# Patient Record
Sex: MALE
[De-identification: names, ages, dates, MRNs, and addresses within clinical notes are randomized per-mention and may not be internally consistent; named-entity substitution may affect disease eponyms.]

---

## 2018-08-31 ENCOUNTER — HOSPITAL (OUTPATIENT)
Dept: OTHER | Age: 48
End: 2018-08-31
Attending: OBSTETRICS & GYNECOLOGY

## 2018-09-06 ENCOUNTER — HOSPITAL (OUTPATIENT)
Dept: OTHER | Age: 48
End: 2018-09-06
Attending: OBSTETRICS & GYNECOLOGY

## 2018-09-06 LAB
APPEARANCE SEMIN PLAS: NORMAL
COLLECTION METHOD SMN: ABNORMAL
LIQUEFACTION TIME SMN: NORMAL MIN
PH SMN: 8 [PH]
SEX ABSTIN DURATION TIME PATIENT: 2 D (ref 2–7)
SPECIMEN VOL SMN: 1.5 ML
SPERM # SMN: 12 MILLION/ML
SPERM IMMOTILE NFR SMN: 59 %
SPERM MOTILE 1H P EJAC NFR SMN: 19 %
SPERM NONPROG NFR SMN: 22 %
SPERM NORM NFR SMN: 16 %
SPERM VIABLE NFR SMN: 52 %
TYPE OF CONTAINER: ABNORMAL
VISC SMN QL: NORMAL

## 2019-11-18 ENCOUNTER — OFFICE VISIT (OUTPATIENT)
Dept: ORTHOPEDICS CLINIC | Facility: CLINIC | Age: 49
End: 2019-11-18

## 2019-11-18 VITALS — BODY MASS INDEX: 33.77 KG/M2 | WEIGHT: 228 LBS | HEIGHT: 69 IN

## 2019-11-18 DIAGNOSIS — S46.211A RUPTURE OF DISTAL BICEPS TENDON, RIGHT, INITIAL ENCOUNTER: Primary | ICD-10-CM

## 2019-11-18 PROCEDURE — 99244 OFF/OP CNSLTJ NEW/EST MOD 40: CPT | Performed by: ORTHOPAEDIC SURGERY

## 2019-11-18 NOTE — H&P
NURSING INTAKE COMMENTS: Patient presents with:   Injury: Right arm - onset 3 weeks ago when he felt dizzy and fell on the stairs and he grabbed the rails and pulled the muscle in his R arm - he has an MRI disc with him - no pain - the arm gets tiered fast blurred vision or double vision  HEENT: denies new nasal congestion, sinus pain or ST  LUNGS: denies shortness of breath  CARDIOVASCULAR: denies chest pain  GI: no hematemesis, no worsening heartburn, no diarrhea  : no dysuria, no blood in urine, no diff if the distal tendon is ruptured, I would generally recommend surgery although it will be much more difficult since his that he is at least 3 weeks out.   I recommended some additional cuts of the MRI to better characterize this since the tendon does appear

## 2020-02-28 ENCOUNTER — HOSPITAL ENCOUNTER (OUTPATIENT)
Dept: CT IMAGING | Facility: HOSPITAL | Age: 50
Discharge: HOME | End: 2020-02-28
Attending: INTERNAL MEDICINE

## 2020-02-28 DIAGNOSIS — E78.01 FAMILIAL HYPERCHOLESTEROLEMIA: ICD-10-CM

## (undated) NOTE — LETTER
01/13/20        Vinny Waller  [address] [facility] [id number]      Dear Zion Bartlett,    [facility] records indicate that you have outstanding lab work and or testing that was ordered for you and has not yet been completed:      MRI Elbow Joint Ri